# Patient Record
Sex: FEMALE | Race: BLACK OR AFRICAN AMERICAN | Employment: UNEMPLOYED | URBAN - NONMETROPOLITAN AREA
[De-identification: names, ages, dates, MRNs, and addresses within clinical notes are randomized per-mention and may not be internally consistent; named-entity substitution may affect disease eponyms.]

---

## 2022-11-06 ENCOUNTER — HOSPITAL ENCOUNTER (EMERGENCY)
Age: 22
Discharge: HOME | End: 2022-11-06
Attending: EMERGENCY MEDICINE

## 2022-11-06 VITALS
SYSTOLIC BLOOD PRESSURE: 121 MMHG | HEIGHT: 67 IN | WEIGHT: 293 LBS | BODY MASS INDEX: 45.99 KG/M2 | HEART RATE: 81 BPM | RESPIRATION RATE: 18 BRPM | TEMPERATURE: 98.9 F | DIASTOLIC BLOOD PRESSURE: 73 MMHG | OXYGEN SATURATION: 100 %

## 2022-11-06 DIAGNOSIS — G43.909 MIGRAINE WITHOUT STATUS MIGRAINOSUS, NOT INTRACTABLE, UNSPECIFIED MIGRAINE TYPE: Primary | ICD-10-CM

## 2022-11-06 PROCEDURE — 74011250636 HC RX REV CODE- 250/636: Performed by: EMERGENCY MEDICINE

## 2022-11-06 PROCEDURE — 74011250637 HC RX REV CODE- 250/637: Performed by: EMERGENCY MEDICINE

## 2022-11-06 PROCEDURE — 99283 EMERGENCY DEPT VISIT LOW MDM: CPT

## 2022-11-06 RX ORDER — ONDANSETRON 4 MG/1
8 TABLET, ORALLY DISINTEGRATING ORAL
Status: COMPLETED | OUTPATIENT
Start: 2022-11-06 | End: 2022-11-06

## 2022-11-06 RX ORDER — IBUPROFEN 800 MG/1
800 TABLET ORAL
Qty: 20 TABLET | Refills: 0 | Status: SHIPPED | OUTPATIENT
Start: 2022-11-06 | End: 2022-11-13

## 2022-11-06 RX ORDER — ONDANSETRON 8 MG/1
8 TABLET, ORALLY DISINTEGRATING ORAL
Qty: 12 TABLET | Refills: 0 | Status: SHIPPED | OUTPATIENT
Start: 2022-11-06

## 2022-11-06 RX ORDER — IBUPROFEN 800 MG/1
800 TABLET ORAL ONCE
Status: COMPLETED | OUTPATIENT
Start: 2022-11-06 | End: 2022-11-06

## 2022-11-06 RX ADMIN — IBUPROFEN 800 MG: 800 TABLET, FILM COATED ORAL at 11:18

## 2022-11-06 RX ADMIN — ONDANSETRON 8 MG: 4 TABLET, ORALLY DISINTEGRATING ORAL at 11:18

## 2022-11-06 NOTE — ED TRIAGE NOTES
Patient reports waking up this morning with migraine that has cause her nausea and has progressively gotten worse as the day has gone on. No history of same. 137.7 lbs (11/11)/dosing

## 2022-11-06 NOTE — ED PROVIDER NOTES
HPI 68-year-old female presents ED complaining of a left-sided temporal frontal headache with nausea no vomiting some photosensitivity. No fever or recent illness. Denies sinus symptoms. Patient has a history of unilateral migratory right and left headaches that are described as throbbing with nausea. No clear previous evaluation had been told at one point she had migraines. No focal neurologic symptoms. Transient right lateral visual blurring with onset of headache    History reviewed. No pertinent past medical history. History reviewed. No pertinent surgical history. History reviewed. No pertinent family history. Social History     Socioeconomic History    Marital status: UNKNOWN     Spouse name: Not on file    Number of children: Not on file    Years of education: Not on file    Highest education level: Not on file   Occupational History    Not on file   Tobacco Use    Smoking status: Never    Smokeless tobacco: Never   Substance and Sexual Activity    Alcohol use: Yes     Comment: occasional    Drug use: Never    Sexual activity: Yes     Birth control/protection: None   Other Topics Concern    Not on file   Social History Narrative    Not on file     Social Determinants of Health     Financial Resource Strain: Not on file   Food Insecurity: Not on file   Transportation Needs: Not on file   Physical Activity: Not on file   Stress: Not on file   Social Connections: Not on file   Intimate Partner Violence: Not on file   Housing Stability: Not on file         ALLERGIES: Latex and Pineapple    Review of Systems   Constitutional: Negative. HENT: Negative. Eyes: Negative. Respiratory:  Negative for cough, chest tightness, shortness of breath and wheezing. Cardiovascular:  Negative for chest pain, palpitations and leg swelling. Gastrointestinal:  Positive for nausea. Negative for abdominal distention, abdominal pain, blood in stool, constipation, diarrhea and vomiting.    Endocrine: Negative. Genitourinary:  Negative for difficulty urinating, dysuria, flank pain, frequency and hematuria. Musculoskeletal: Negative. Neurological:  Positive for headaches. Negative for dizziness, seizures, speech difficulty, weakness and numbness. Hematological: Negative. Psychiatric/Behavioral: Negative. Vitals:    11/06/22 1101   BP: 121/73   Pulse: 81   Resp: 18   Temp: 98.9 °F (37.2 °C)   SpO2: 100%   Weight: (!) 158.9 kg (350 lb 6.4 oz)   Height: 5' 7\" (1.702 m)          Pleasant young AA female no acute distress awake alert oriented x4 coherent  Physical Exam  Vitals and nursing note reviewed. Constitutional:       General: She is not in acute distress. Appearance: Normal appearance. She is obese. She is not ill-appearing, toxic-appearing or diaphoretic. HENT:      Head: Normocephalic and atraumatic. Right Ear: Tympanic membrane normal.      Left Ear: Tympanic membrane normal.      Nose: Nose normal.      Mouth/Throat:      Mouth: Mucous membranes are moist.   Eyes:      Extraocular Movements: Extraocular movements intact. Conjunctiva/sclera: Conjunctivae normal.      Pupils: Pupils are equal, round, and reactive to light. Cardiovascular:      Rate and Rhythm: Normal rate and regular rhythm. Pulses: Normal pulses. Heart sounds: Normal heart sounds. No murmur heard. Pulmonary:      Effort: Pulmonary effort is normal. No respiratory distress. Breath sounds: Normal breath sounds. No wheezing, rhonchi or rales. Abdominal:      General: Bowel sounds are normal. There is no distension. Palpations: Abdomen is soft. There is no mass. Tenderness: There is no abdominal tenderness. There is no right CVA tenderness, left CVA tenderness, guarding or rebound. Hernia: No hernia is present. Musculoskeletal:         General: No swelling, tenderness, deformity or signs of injury. Normal range of motion.       Cervical back: Normal range of motion and neck supple. No rigidity or tenderness. Right lower leg: No edema. Left lower leg: No edema. Lymphadenopathy:      Cervical: No cervical adenopathy. Skin:     General: Skin is warm and dry. Capillary Refill: Capillary refill takes less than 2 seconds. Findings: No erythema, lesion or rash. Neurological:      General: No focal deficit present. Mental Status: She is alert and oriented to person, place, and time. Mental status is at baseline. Cranial Nerves: No cranial nerve deficit. Sensory: No sensory deficit. Psychiatric:         Mood and Affect: Mood normal.         Behavior: Behavior normal.         Thought Content: Thought content normal.        MDM  58-year-old female with a multiyear history of unilateral migratory headaches that appear to be vascular or migraine in origin previously treated with over-the-counter meds.   Today with nausea primary associated symptoms options discussed regarding IV therapy versus oral versus IM patient prefers oral meds given Dr. Светлана Jay for follow-up instructed return to ED if symptoms worsen       Procedures

## 2023-05-04 ENCOUNTER — APPOINTMENT (OUTPATIENT)
Dept: GENERAL RADIOLOGY | Age: 23
End: 2023-05-04
Attending: EMERGENCY MEDICINE
Payer: COMMERCIAL

## 2023-05-04 ENCOUNTER — HOSPITAL ENCOUNTER (EMERGENCY)
Age: 23
Discharge: HOME | End: 2023-05-04
Attending: EMERGENCY MEDICINE
Payer: COMMERCIAL

## 2023-05-04 VITALS
RESPIRATION RATE: 17 BRPM | WEIGHT: 293 LBS | HEART RATE: 78 BPM | DIASTOLIC BLOOD PRESSURE: 78 MMHG | TEMPERATURE: 98.4 F | SYSTOLIC BLOOD PRESSURE: 119 MMHG | BODY MASS INDEX: 45.99 KG/M2 | HEIGHT: 67 IN | OXYGEN SATURATION: 99 %

## 2023-05-04 DIAGNOSIS — M94.0 COSTOCHONDRITIS: Primary | ICD-10-CM

## 2023-05-04 DIAGNOSIS — R07.89 CHEST WALL PAIN: ICD-10-CM

## 2023-05-04 LAB
ALBUMIN SERPL-MCNC: 3.6 G/DL (ref 3.5–5)
ALBUMIN/GLOB SERPL: 0.7 (ref 1.1–2.2)
ALP SERPL-CCNC: 94 U/L (ref 45–117)
ALT SERPL-CCNC: 28 U/L (ref 12–78)
ANION GAP SERPL CALC-SCNC: 9 MMOL/L (ref 5–15)
AST SERPL W P-5'-P-CCNC: 16 U/L (ref 15–37)
BASOPHILS # BLD: 0 K/UL (ref 0–0.1)
BASOPHILS NFR BLD: 0 % (ref 0–1)
BILIRUB SERPL-MCNC: 0.3 MG/DL (ref 0.2–1)
BUN SERPL-MCNC: 12 MG/DL (ref 6–20)
BUN/CREAT SERPL: 12 (ref 12–20)
CA-I BLD-MCNC: 9.4 MG/DL (ref 8.5–10.1)
CHLORIDE SERPL-SCNC: 105 MMOL/L (ref 97–108)
CO2 SERPL-SCNC: 27 MMOL/L (ref 21–32)
CREAT SERPL-MCNC: 0.98 MG/DL (ref 0.55–1.02)
DIFFERENTIAL METHOD BLD: ABNORMAL
EOSINOPHIL # BLD: 0.1 K/UL (ref 0–0.4)
EOSINOPHIL NFR BLD: 1 % (ref 0–7)
ERYTHROCYTE [DISTWIDTH] IN BLOOD BY AUTOMATED COUNT: 16.3 % (ref 11.5–14.5)
GLOBULIN SER CALC-MCNC: 4.9 G/DL (ref 2–4)
GLUCOSE SERPL-MCNC: 84 MG/DL (ref 65–100)
HCG UR QL: NEGATIVE
HCT VFR BLD AUTO: 39.7 % (ref 35–47)
HGB BLD-MCNC: 12.5 G/DL (ref 11.5–16)
IMM GRANULOCYTES # BLD AUTO: 0 K/UL (ref 0–0.04)
IMM GRANULOCYTES NFR BLD AUTO: 0 % (ref 0–0.5)
LYMPHOCYTES # BLD: 5 K/UL (ref 0.8–3.5)
LYMPHOCYTES NFR BLD: 46 % (ref 12–49)
MCH RBC QN AUTO: 24.8 PG (ref 26–34)
MCHC RBC AUTO-ENTMCNC: 31.5 G/DL (ref 30–36.5)
MCV RBC AUTO: 78.6 FL (ref 80–99)
MONOCYTES # BLD: 0.5 K/UL (ref 0–1)
MONOCYTES NFR BLD: 5 % (ref 5–13)
NEUTS SEG # BLD: 5.1 K/UL (ref 1.8–8)
NEUTS SEG NFR BLD: 48 % (ref 32–75)
NRBC # BLD: 0 K/UL (ref 0–0.01)
NRBC BLD-RTO: 0 PER 100 WBC
PLATELET # BLD AUTO: 414 K/UL (ref 150–400)
PMV BLD AUTO: 8.3 FL (ref 8.9–12.9)
POTASSIUM SERPL-SCNC: 3.4 MMOL/L (ref 3.5–5.1)
PROT SERPL-MCNC: 8.5 G/DL (ref 6.4–8.2)
RBC # BLD AUTO: 5.05 M/UL (ref 3.8–5.2)
SODIUM SERPL-SCNC: 141 MMOL/L (ref 136–145)
TROPONIN I SERPL HS-MCNC: 5 NG/L (ref 0–51)
WBC # BLD AUTO: 10.8 K/UL (ref 3.6–11)

## 2023-05-04 PROCEDURE — 84484 ASSAY OF TROPONIN QUANT: CPT

## 2023-05-04 PROCEDURE — 80053 COMPREHEN METABOLIC PANEL: CPT

## 2023-05-04 PROCEDURE — 71045 X-RAY EXAM CHEST 1 VIEW: CPT

## 2023-05-04 PROCEDURE — 81025 URINE PREGNANCY TEST: CPT

## 2023-05-04 PROCEDURE — 36415 COLL VENOUS BLD VENIPUNCTURE: CPT

## 2023-05-04 PROCEDURE — 85025 COMPLETE CBC W/AUTO DIFF WBC: CPT

## 2023-05-04 PROCEDURE — 93005 ELECTROCARDIOGRAM TRACING: CPT

## 2023-05-04 PROCEDURE — 99285 EMERGENCY DEPT VISIT HI MDM: CPT

## 2023-05-04 RX ORDER — METHYLPREDNISOLONE 4 MG/1
TABLET ORAL
Qty: 1 DOSE PACK | Refills: 0 | Status: SHIPPED | OUTPATIENT
Start: 2023-05-04

## 2023-05-05 LAB
ATRIAL RATE: 76 BPM
CALCULATED P AXIS, ECG09: 11 DEGREES
CALCULATED R AXIS, ECG10: 53 DEGREES
CALCULATED T AXIS, ECG11: -6 DEGREES
DIAGNOSIS, 93000: NORMAL
P-R INTERVAL, ECG05: 138 MS
Q-T INTERVAL, ECG07: 382 MS
QRS DURATION, ECG06: 71 MS
QTC CALCULATION (BEZET), ECG08: 430 MS
VENTRICULAR RATE, ECG03: 76 BPM

## 2023-09-12 ENCOUNTER — HOSPITAL ENCOUNTER (EMERGENCY)
Facility: HOSPITAL | Age: 23
Discharge: HOME | End: 2023-09-12
Attending: EMERGENCY MEDICINE

## 2023-09-12 VITALS
HEIGHT: 68 IN | DIASTOLIC BLOOD PRESSURE: 90 MMHG | WEIGHT: 293 LBS | HEART RATE: 80 BPM | SYSTOLIC BLOOD PRESSURE: 130 MMHG | OXYGEN SATURATION: 100 % | TEMPERATURE: 97.5 F | BODY MASS INDEX: 44.41 KG/M2 | RESPIRATION RATE: 18 BRPM

## 2023-09-12 DIAGNOSIS — B97.89 VIRAL RESPIRATORY ILLNESS: Primary | ICD-10-CM

## 2023-09-12 DIAGNOSIS — J98.8 VIRAL RESPIRATORY ILLNESS: Primary | ICD-10-CM

## 2023-09-12 LAB
FLUAV RNA SPEC QL NAA+PROBE: NOT DETECTED
FLUBV RNA SPEC QL NAA+PROBE: NOT DETECTED
SARS-COV-2 RNA RESP QL NAA+PROBE: NOT DETECTED

## 2023-09-12 PROCEDURE — 99283 EMERGENCY DEPT VISIT LOW MDM: CPT

## 2023-09-12 PROCEDURE — 87636 SARSCOV2 & INF A&B AMP PRB: CPT

## 2023-09-12 RX ORDER — GUAIFENESIN/DEXTROMETHORPHAN 100-10MG/5
5 SYRUP ORAL 4 TIMES DAILY PRN
Qty: 1 EACH | Refills: 0 | Status: SHIPPED | OUTPATIENT
Start: 2023-09-12 | End: 2023-09-22

## 2023-09-12 RX ORDER — ONDANSETRON 4 MG/1
4 TABLET, ORALLY DISINTEGRATING ORAL 3 TIMES DAILY PRN
Qty: 21 TABLET | Refills: 0 | Status: SHIPPED | OUTPATIENT
Start: 2023-09-12

## 2023-09-12 RX ORDER — IBUPROFEN 400 MG/1
400 TABLET ORAL EVERY 6 HOURS PRN
Qty: 120 TABLET | Refills: 0 | Status: SHIPPED | OUTPATIENT
Start: 2023-09-12

## 2023-09-12 RX ORDER — FLUTICASONE PROPIONATE 50 MCG
1 SPRAY, SUSPENSION (ML) NASAL DAILY
Qty: 1 EACH | Refills: 0 | Status: SHIPPED | OUTPATIENT
Start: 2023-09-12 | End: 2023-09-22

## 2023-09-12 RX ORDER — BENZONATATE 100 MG/1
100 CAPSULE ORAL 3 TIMES DAILY PRN
Qty: 20 CAPSULE | Refills: 0 | Status: SHIPPED | OUTPATIENT
Start: 2023-09-12 | End: 2023-09-19

## 2023-09-12 RX ORDER — ACETAMINOPHEN 500 MG
500 TABLET ORAL 4 TIMES DAILY PRN
Qty: 120 TABLET | Refills: 0 | Status: SHIPPED | OUTPATIENT
Start: 2023-09-12

## 2023-09-12 ASSESSMENT — PAIN - FUNCTIONAL ASSESSMENT: PAIN_FUNCTIONAL_ASSESSMENT: 0-10

## 2023-09-12 ASSESSMENT — LIFESTYLE VARIABLES
HOW OFTEN DO YOU HAVE A DRINK CONTAINING ALCOHOL: NEVER
HOW MANY STANDARD DRINKS CONTAINING ALCOHOL DO YOU HAVE ON A TYPICAL DAY: PATIENT DOES NOT DRINK

## 2023-09-12 ASSESSMENT — PAIN DESCRIPTION - LOCATION: LOCATION: LEG

## 2023-09-12 ASSESSMENT — PAIN DESCRIPTION - PAIN TYPE: TYPE: ACUTE PAIN

## 2023-09-12 ASSESSMENT — PAIN SCALES - GENERAL: PAINLEVEL_OUTOF10: 5

## 2023-09-12 NOTE — ED PROVIDER NOTES
ED COURSE       Disposition Considerations (Tests not done, Shared Decision Making, Pt Expectation of Test or Treatment.): Not Applicable    Patient was given the following medications:  Medications - No data to display    CONSULTS: (Who and What was discussed)  None     Social Determinants affecting Dx or Tx: None    Smoking Cessation: Not Applicable    PROCEDURES   Unless otherwise noted above, none  Procedures      CRITICAL CARE TIME   Patient does not meet Critical Care Time, 0 minutes    ED FINAL IMPRESSION     1. Viral respiratory illness          DISPOSITION/PLAN   DISPOSITION      Discharge Note: The patient is stable for discharge home. The signs, symptoms, diagnosis, and discharge instructions have been discussed, understanding conveyed, and agreed upon. The patient is to follow up as recommended or return to ER should their symptoms worsen.       PATIENT REFERRED TO:  your primary care provider    Call in 3 days  for followup        DISCHARGE MEDICATIONS:     Medication List        START taking these medications      acetaminophen 500 MG tablet  Commonly known as: TYLENOL  Take 1 tablet by mouth 4 times daily as needed for Pain     benzonatate 100 MG capsule  Commonly known as: TESSALON  Take 1 capsule by mouth 3 times daily as needed for Cough     fluticasone 50 MCG/ACT nasal spray  Commonly known as: FLONASE  1 spray by Nasal route daily for 10 days     guaiFENesin-dextromethorphan 100-10 MG/5ML syrup  Commonly known as: ROBITUSSIN DM  Take 5 mLs by mouth 4 times daily as needed for Cough     ibuprofen 400 MG tablet  Commonly known as: IBU  Take 1 tablet by mouth every 6 hours as needed for Pain            CHANGE how you take these medications      ondansetron 4 MG disintegrating tablet  Commonly known as: ZOFRAN-ODT  Take 1 tablet by mouth 3 times daily as needed for Nausea or Vomiting  What changed:   medication strength  how much to take  when to take this  reasons to take this

## 2024-03-30 ENCOUNTER — HOSPITAL ENCOUNTER (INPATIENT)
Facility: HOSPITAL | Age: 24
LOS: 4 days | Discharge: HOME | DRG: 881 | End: 2024-04-03
Attending: EMERGENCY MEDICINE | Admitting: PSYCHIATRY & NEUROLOGY

## 2024-03-30 DIAGNOSIS — R45.851 SUICIDAL IDEATION: Primary | ICD-10-CM

## 2024-03-30 PROBLEM — F12.10 CANNABIS ABUSE: Status: ACTIVE | Noted: 2024-03-30

## 2024-03-30 PROBLEM — F41.1 GENERALIZED ANXIETY DISORDER: Status: ACTIVE | Noted: 2024-03-30

## 2024-03-30 PROBLEM — F32.2 MAJOR DEPRESSIVE DISORDER, SEVERE (HCC): Status: ACTIVE | Noted: 2024-03-30

## 2024-03-30 PROBLEM — F32.9 MAJOR DEPRESSIVE DISORDER WITH SINGLE EPISODE: Status: ACTIVE | Noted: 2024-03-30

## 2024-03-30 PROBLEM — F32.A DEPRESSION WITH SUICIDAL IDEATION: Status: ACTIVE | Noted: 2024-03-30

## 2024-03-30 PROBLEM — F43.10 POST TRAUMATIC STRESS DISORDER (PTSD): Status: ACTIVE | Noted: 2024-03-30

## 2024-03-30 PROBLEM — F32.A DEPRESSION WITH SUICIDAL IDEATION: Status: RESOLVED | Noted: 2024-03-30 | Resolved: 2024-03-30

## 2024-03-30 LAB
ALBUMIN SERPL-MCNC: 3.4 G/DL (ref 3.5–5)
ALBUMIN/GLOB SERPL: 0.6 (ref 1.1–2.2)
ALP SERPL-CCNC: 109 U/L (ref 45–117)
ALT SERPL-CCNC: 28 U/L (ref 12–78)
AMPHET UR QL SCN: NEGATIVE
ANION GAP SERPL CALC-SCNC: 12 MMOL/L (ref 5–15)
APAP SERPL-MCNC: <2 UG/ML (ref 10–30)
APPEARANCE UR: CLEAR
AST SERPL W P-5'-P-CCNC: 15 U/L (ref 15–37)
BACTERIA URNS QL MICRO: ABNORMAL /HPF
BARBITURATES UR QL SCN: NEGATIVE
BASOPHILS # BLD: 0 K/UL (ref 0–0.1)
BASOPHILS NFR BLD: 0 % (ref 0–1)
BENZODIAZ UR QL: NEGATIVE
BILIRUB SERPL-MCNC: 0.3 MG/DL (ref 0.2–1)
BILIRUB UR QL: NEGATIVE
BUN SERPL-MCNC: 10 MG/DL (ref 6–20)
BUN/CREAT SERPL: 11 (ref 12–20)
CA-I BLD-MCNC: 9.2 MG/DL (ref 8.5–10.1)
CANNABINOIDS UR QL SCN: POSITIVE
CHLORIDE SERPL-SCNC: 101 MMOL/L (ref 97–108)
CO2 SERPL-SCNC: 25 MMOL/L (ref 21–32)
COCAINE UR QL SCN: NEGATIVE
COLOR UR: ABNORMAL
CREAT SERPL-MCNC: 0.93 MG/DL (ref 0.55–1.02)
DATE LAST DOSE: ABNORMAL
DATE LAST DOSE: ABNORMAL
DIFFERENTIAL METHOD BLD: ABNORMAL
DOSE AMOUNT: ABNORMAL UNITS
DOSE AMOUNT: ABNORMAL UNITS
EOSINOPHIL # BLD: 0.1 K/UL (ref 0–0.4)
EOSINOPHIL NFR BLD: 1 % (ref 0–7)
ERYTHROCYTE [DISTWIDTH] IN BLOOD BY AUTOMATED COUNT: 16.2 % (ref 11.5–14.5)
ETHANOL SERPL-MCNC: <10 MG/DL (ref 0–0.08)
FLUAV RNA SPEC QL NAA+PROBE: NOT DETECTED
FLUBV RNA SPEC QL NAA+PROBE: NOT DETECTED
GLOBULIN SER CALC-MCNC: 5.8 G/DL (ref 2–4)
GLUCOSE SERPL-MCNC: 105 MG/DL (ref 65–100)
GLUCOSE UR STRIP.AUTO-MCNC: NEGATIVE MG/DL
HCG UR QL: NEGATIVE
HCT VFR BLD AUTO: 39.8 % (ref 35–47)
HGB BLD-MCNC: 12.9 G/DL (ref 11.5–16)
HGB UR QL STRIP: ABNORMAL
IMM GRANULOCYTES # BLD AUTO: 0.1 K/UL (ref 0–0.04)
IMM GRANULOCYTES NFR BLD AUTO: 1 % (ref 0–0.5)
KETONES UR QL STRIP.AUTO: NEGATIVE MG/DL
LEUKOCYTE ESTERASE UR QL STRIP.AUTO: ABNORMAL
LYMPHOCYTES # BLD: 4 K/UL (ref 0.8–3.5)
LYMPHOCYTES NFR BLD: 36 % (ref 12–49)
Lab: ABNORMAL
MAGNESIUM SERPL-MCNC: 1.8 MG/DL (ref 1.6–2.4)
MCH RBC QN AUTO: 24.8 PG (ref 26–34)
MCHC RBC AUTO-ENTMCNC: 32.4 G/DL (ref 30–36.5)
MCV RBC AUTO: 76.4 FL (ref 80–99)
MDMA URINE: NEGATIVE
METHADONE UR QL: NEGATIVE
MONOCYTES # BLD: 0.5 K/UL (ref 0–1)
MONOCYTES NFR BLD: 5 % (ref 5–13)
NEUTS SEG # BLD: 6.2 K/UL (ref 1.8–8)
NEUTS SEG NFR BLD: 57 % (ref 32–75)
NITRITE UR QL STRIP.AUTO: NEGATIVE
NRBC # BLD: 0 K/UL (ref 0–0.01)
NRBC BLD-RTO: 0 PER 100 WBC
OPIATES UR QL: NEGATIVE
PCP UR QL: NEGATIVE
PH UR STRIP: 6 (ref 5–8)
PLATELET # BLD AUTO: 394 K/UL (ref 150–400)
PMV BLD AUTO: 8.2 FL (ref 8.9–12.9)
POTASSIUM SERPL-SCNC: 3.8 MMOL/L (ref 3.5–5.1)
PROT SERPL-MCNC: 9.2 G/DL (ref 6.4–8.2)
PROT UR STRIP-MCNC: NEGATIVE MG/DL
RBC # BLD AUTO: 5.21 M/UL (ref 3.8–5.2)
RBC #/AREA URNS HPF: ABNORMAL /HPF (ref 0–3)
SALICYLATES SERPL-MCNC: 2.5 MG/DL (ref 2.8–20)
SARS-COV-2 RNA RESP QL NAA+PROBE: NOT DETECTED
SODIUM SERPL-SCNC: 138 MMOL/L (ref 136–145)
SP GR UR REFRACTOMETRY: 1.02 (ref 1–1.03)
UROBILINOGEN UR QL STRIP.AUTO: 0.2 EU/DL (ref 0.2–1)
WBC # BLD AUTO: 10.9 K/UL (ref 3.6–11)
WBC URNS QL MICRO: ABNORMAL /HPF (ref 0–5)

## 2024-03-30 PROCEDURE — 36415 COLL VENOUS BLD VENIPUNCTURE: CPT

## 2024-03-30 PROCEDURE — 80179 DRUG ASSAY SALICYLATE: CPT

## 2024-03-30 PROCEDURE — 83036 HEMOGLOBIN GLYCOSYLATED A1C: CPT

## 2024-03-30 PROCEDURE — 82077 ASSAY SPEC XCP UR&BREATH IA: CPT

## 2024-03-30 PROCEDURE — 81001 URINALYSIS AUTO W/SCOPE: CPT

## 2024-03-30 PROCEDURE — 6370000000 HC RX 637 (ALT 250 FOR IP): Performed by: PSYCHIATRY & NEUROLOGY

## 2024-03-30 PROCEDURE — 80143 DRUG ASSAY ACETAMINOPHEN: CPT

## 2024-03-30 PROCEDURE — 80307 DRUG TEST PRSMV CHEM ANLYZR: CPT

## 2024-03-30 PROCEDURE — 81025 URINE PREGNANCY TEST: CPT

## 2024-03-30 PROCEDURE — 80053 COMPREHEN METABOLIC PANEL: CPT

## 2024-03-30 PROCEDURE — 87636 SARSCOV2 & INF A&B AMP PRB: CPT

## 2024-03-30 PROCEDURE — 1240000000 HC EMOTIONAL WELLNESS R&B

## 2024-03-30 PROCEDURE — 85025 COMPLETE CBC W/AUTO DIFF WBC: CPT

## 2024-03-30 PROCEDURE — 80061 LIPID PANEL: CPT

## 2024-03-30 PROCEDURE — 6370000000 HC RX 637 (ALT 250 FOR IP): Performed by: PHYSICIAN ASSISTANT

## 2024-03-30 PROCEDURE — 83735 ASSAY OF MAGNESIUM: CPT

## 2024-03-30 PROCEDURE — 99285 EMERGENCY DEPT VISIT HI MDM: CPT

## 2024-03-30 RX ORDER — HALOPERIDOL 5 MG/1
5 TABLET ORAL EVERY 4 HOURS PRN
Status: DISCONTINUED | OUTPATIENT
Start: 2024-03-30 | End: 2024-04-03 | Stop reason: HOSPADM

## 2024-03-30 RX ORDER — HALOPERIDOL 5 MG/ML
5 INJECTION INTRAMUSCULAR EVERY 4 HOURS PRN
Status: DISCONTINUED | OUTPATIENT
Start: 2024-03-30 | End: 2024-04-03 | Stop reason: HOSPADM

## 2024-03-30 RX ORDER — HYDROXYZINE 50 MG/1
50 TABLET, FILM COATED ORAL 3 TIMES DAILY PRN
Status: DISCONTINUED | OUTPATIENT
Start: 2024-03-30 | End: 2024-04-03 | Stop reason: HOSPADM

## 2024-03-30 RX ORDER — ACETAMINOPHEN 325 MG/1
650 TABLET ORAL EVERY 4 HOURS PRN
Status: DISCONTINUED | OUTPATIENT
Start: 2024-03-30 | End: 2024-04-03 | Stop reason: HOSPADM

## 2024-03-30 RX ORDER — MAGNESIUM HYDROXIDE/ALUMINUM HYDROXICE/SIMETHICONE 120; 1200; 1200 MG/30ML; MG/30ML; MG/30ML
30 SUSPENSION ORAL EVERY 6 HOURS PRN
Status: DISCONTINUED | OUTPATIENT
Start: 2024-03-30 | End: 2024-04-03 | Stop reason: HOSPADM

## 2024-03-30 RX ORDER — DIPHENHYDRAMINE HYDROCHLORIDE 50 MG/ML
50 INJECTION INTRAMUSCULAR; INTRAVENOUS EVERY 4 HOURS PRN
Status: DISCONTINUED | OUTPATIENT
Start: 2024-03-30 | End: 2024-04-03 | Stop reason: HOSPADM

## 2024-03-30 RX ORDER — ESCITALOPRAM OXALATE 10 MG/1
10 TABLET ORAL DAILY
Status: DISCONTINUED | OUTPATIENT
Start: 2024-03-30 | End: 2024-04-03 | Stop reason: HOSPADM

## 2024-03-30 RX ORDER — TRAZODONE HYDROCHLORIDE 50 MG/1
50 TABLET ORAL NIGHTLY PRN
Status: DISCONTINUED | OUTPATIENT
Start: 2024-03-30 | End: 2024-04-03 | Stop reason: HOSPADM

## 2024-03-30 RX ORDER — POLYETHYLENE GLYCOL 3350 17 G/17G
17 POWDER, FOR SOLUTION ORAL DAILY PRN
Status: DISCONTINUED | OUTPATIENT
Start: 2024-03-30 | End: 2024-04-03 | Stop reason: HOSPADM

## 2024-03-30 RX ADMIN — ESCITALOPRAM OXALATE 10 MG: 10 TABLET ORAL at 20:51

## 2024-03-30 RX ADMIN — HYDROXYZINE HYDROCHLORIDE 50 MG: 50 TABLET, FILM COATED ORAL at 21:10

## 2024-03-30 ASSESSMENT — LIFESTYLE VARIABLES
HOW MANY STANDARD DRINKS CONTAINING ALCOHOL DO YOU HAVE ON A TYPICAL DAY: PATIENT DOES NOT DRINK
HOW OFTEN DO YOU HAVE A DRINK CONTAINING ALCOHOL: NEVER

## 2024-03-30 ASSESSMENT — SLEEP AND FATIGUE QUESTIONNAIRES
SLEEP PATTERN: DIFFICULTY FALLING ASLEEP
DO YOU USE A SLEEP AID: NO
DO YOU HAVE DIFFICULTY SLEEPING: YES
AVERAGE NUMBER OF SLEEP HOURS: 5

## 2024-03-30 NOTE — ED PROVIDER NOTES
[facility] EMERGENCY DEPT  EMERGENCY DEPARTMENT HISTORY AND PHYSICAL EXAM      Date: 3/30/2024  Patient Name: Princess Tracey  MRN: [id number]  YOB: 2000  Date of evaluation: 3/30/2024  Provider: Osvaldo Hernandez MD   Note Started: 12:41 PM EDT 3/30/24    HISTORY OF PRESENT ILLNESS     Chief Complaint   Patient presents with    Mental Health Problem       History Provided By: Patient    HPI: Princess Tracey is a 24 y.o. female here with suicidal ideation.  Her  found her in the bathroom crying with a gun.  Patient states she is been depressed for several years on and off but has never seen a therapist.  She states when she was on some medication for anxiety while in college but does not recall the name of the medicine.  Things have been worse recently with a lot of stress and death in the family.  No hallucinations.  Denies any substance use.  States she is a social drinker.  No alcohol today.    PAST MEDICAL HISTORY   Past Medical History:  History reviewed. No pertinent past medical history.    Past Surgical History:  History reviewed. No pertinent surgical history.    Family History:  History reviewed. No pertinent family history.    Social History:  Social History     Tobacco Use    Smoking status: Never    Smokeless tobacco: Never   Substance Use Topics    Alcohol use: Yes    Drug use: Never       Allergies:  Allergies   Allergen Reactions    Latex Hives    Pineapple Anaphylaxis       PCP: No primary care provider on file.    Current Meds:   No current facility-administered medications for this encounter.       Social Determinants of Health:   Social Determinants of Health     Tobacco Use: Low Risk  (3/30/2024)    Patient History     Smoking Tobacco Use: Never     Smokeless Tobacco Use: Never     Passive Exposure: Not on file   Alcohol Use: Not At Risk (3/30/2024)    AUDIT-C     Frequency of Alcohol Consumption: Never     Average Number of Drinks: Patient does not drink     Frequency of Binge

## 2024-03-30 NOTE — ED TRIAGE NOTES
Pt reports she has been under a lot of stress lately with school and having multiple deaths in the family. Family member at bedside reports around 1am he found the pt in the restroom with a gun , pt reports she needs help before she does something bad. PT is calm and tearful. Pt reports she does not plan to harm herself at this. Pt endorses anxiety and depression denies HI, A/V hallucinations. Denies past psychiatric hospitalizations

## 2024-03-30 NOTE — BSMART NOTE
Comprehensive Assessment Form Part 1      Section I - Disposition    Primary Diagnosis: SI with plan, depression, anxiety      The Medical Doctor to Psychiatrist conference was notcompleted.  The Medical Doctor is in agreement with intake assessment.  The plan is medical clearance and voluntary admission.  The on-call Psychiatrist consulted was Dr. SAPP.  The admitting Psychiatrist will be Dr. garcia.  The admitting Diagnosis is SI with plan.      This writer reviewed the [place] Suicide Severity Rating Scale in nursing flowsheet and the risk level assigned is no risk.  Based on this assessment, the risk of suicide is high risk and the plan is see above.    BSMART assessment completed, and suicide risk level noted to be high. Primary Nurse Viktoria and Charge Nurse KVNG/SERJIO and Physician David notified. Concerns observed by this writer.           Section II - Integrated Summary  Summary:  Pt seen and assessed via [facility] at [facility].  Pt dressed in own clothing and appears stated age. Pt resting on ER stretcher in no apparent physical distress.  Pt presents to the ER with complaint of SI with plan.  Pt reports historical diagnosis of anxiety and is not taking prescription medications.  Pt has no provider and has no past inpt admissions.  Pt presents with flat affect and tearful throughout assessment.  Pt alert and oriented to all spheres.  Pt denies HI/AVH.  Pt currently attends [employer] in [facility] and is projected to graduate in May 2024.  Pt states she has been 'overwhelmed with life' and 'overthinking thing' recently.  Pt reports feeling depressed 'for a while' and this morning she retrieved her loaded Glock 44 gun and went into the bathroom with the intention of ending her life.  She states her  happened to walk into the bathroom while she was picking up the gun from the counter.  He was able to stop her without incident and bring her to the hospital.  Pt states she has no past history of SI/NSSI,

## 2024-03-30 NOTE — H&P
INITIAL PSYCHIATRIC EVALUATION            IDENTIFICATION:    Patient Name  Princess Tracey   Date of Birth 2000   [facility] [id number]   Medical Record Number  [id number]      Age  24 y.o.   PCP No primary care provider on file.   Admit date:  3/30/2024    Room Number  105/01  @ [facility]   Date of Service   Last Encounter w/Dept 3/30/2024  Visit date not found             HISTORY         REASON FOR HOSPITALIZATION:  CC: \"I am not sure what I was thinking\". Pt admitted under a voluntary basis for anxiety with suicidal ideations proving to be an imminent danger to self.   HISTORY OF PRESENT ILLNESS:    The patient, Princess Tracey, is a 24 y.o.  Black /  female no past psychiatric history  came to the emergency department after concern of her own and  related to bringing a gun into the bathroom. She states that she normally takes a gun with to take a shower but her  felt she was \"looking or peering\" at the gun differently this time.  When she was asked how she was feeling she responded vaguely and uncertainly.  This response and in context of a prior disagreement opted to come in for help.  Although she describes few to no depressive symptoms but feeling more anxious and overwhelmed. Normally she describes herself as the one everyone leans on for support just that things were building up inside.    She does use marijuana to relax most days with a positive UDS for the same.    Endorses intermittent depressed mood, episodic tearfulness, anhedonia otherwise denies  feelings of guilt, hopelessness, helplessness and worthlessness.  Energy is adequate.  Denied any thoughts of self-harm or suicidal ideation during evaluation.    Endorses excessive worrying, denies social anxiety, panic attacks and OCD.     Denies low threshold of frustration or anger.    Denies history of decreased need for sleep associated with increased energy, racing thoughts, rapid speech and

## 2024-03-31 PROCEDURE — 1240000000 HC EMOTIONAL WELLNESS R&B

## 2024-03-31 PROCEDURE — 6370000000 HC RX 637 (ALT 250 FOR IP): Performed by: PHYSICIAN ASSISTANT

## 2024-03-31 PROCEDURE — 6370000000 HC RX 637 (ALT 250 FOR IP): Performed by: PSYCHIATRY & NEUROLOGY

## 2024-03-31 RX ADMIN — ESCITALOPRAM OXALATE 10 MG: 10 TABLET ORAL at 09:16

## 2024-03-31 RX ADMIN — TRAZODONE HYDROCHLORIDE 50 MG: 50 TABLET ORAL at 20:56

## 2024-03-31 RX ADMIN — HYDROXYZINE HYDROCHLORIDE 50 MG: 50 TABLET, FILM COATED ORAL at 20:56

## 2024-03-31 NOTE — GROUP NOTE
Group Therapy Note    Date: 3/31/2024    Group Start Time: 1445  Group End Time: 1530  Group Topic: Activity    [facility]    Britney Uribe LPN        Group Therapy Note    Attendees: 4       Patient's Goal:  TO MAKE IT THROUGH TODAY AND START TO MANAGE AND TALK ABOUT WHAT MAKES ME ANXIOUS.    Notes:  TV/MOVIE    Status After Intervention:  Improved    Participation Level: Active Listener    Participation Quality: Appropriate and Attentive      Speech:  normal      Thought Process/Content: Logical      Affective Functioning: Congruent      Mood:  CALM      Level of consciousness:  Alert      Response to Learning: Able to verbalize current knowledge/experience      Endings: None Reported    Modes of Intervention: Socialization and Activity      Discipline Responsible: Licensed Practical Nurse and Behavorial Health Tech      Signature:  Britney rUibe LPN     5

## 2024-03-31 NOTE — CONSULTS
Cannabis abuse 3/30/2024 Yes      Patient Active Problem List    Diagnosis Date Noted    Post traumatic stress disorder (PTSD) 03/30/2024    Generalized anxiety disorder 03/30/2024    Major depressive disorder with single episode 03/30/2024    Cannabis abuse 03/30/2024         Obesity  Patient reports a prior diagnosis of prediabetes, her A1c here is within normal range  Spoke with patient about healthy diet, physical activity, weight loss for her future health    2.  Sinus tachycardia  Likely related to anxiety and obesity, however will check TSH and consider EKG if tachycardia persists  Reviewed prior EKG from 2023 showing normal sinus rhythm  Other vital signs normal, no palpitations no chest pain no shortness of breath    3.  Asymptomatic bacteriuria  No intervention indicated, patient denies any symptoms, denies any STD symptoms as well    4.  Cannabis and nicotine use  Encourage cessation of both    5.  Major depressive disorder, RHIANNA, PTSD; suicidal ideation   Management as per primary psychiatry team        Thank you for allowing us to help care for your patient please call with any questions or concerns    55 minutes evaluating and cordinating patient's consult to behavioral health unit for medical and neurological evaluation requested by Dr. Mckay RIVERA        Electronically signed by Vangie Zamora MD on 3/31/2024 at 3:08 PM

## 2024-03-31 NOTE — GROUP NOTE
Group Therapy Note    Date: 3/30/2024    Group Start Time: 2000  Group End Time: 2045  Group Topic: Wrap-Up    [facility]    Elmira Cueva CNA        Group Therapy Note    Attendees: 4       Patient's Goal:  Tomake it through today and she did and know she is feeling better and want to continue to feel better    Notes:  participated in group    Status After Intervention:  Improved    Participation Level: Minimal    Participation Quality: Appropriate      Speech:  normal      Thought Process/Content: Logical      Affective Functioning: Congruent      Mood: anxious and depressed      Level of consciousness:  Alert and Oriented x4      Response to Learning: Able to verbalize current knowledge/experience, Able to verbalize/acknowledge new learning, Able to retain information, Capable of insight, Able to change behavior, and Progressing to goal      Endings: None Reported    Modes of Intervention: Activity      Discipline Responsible: Behavorial Health Tech      Signature:  Elmira Cueva CNA

## 2024-03-31 NOTE — GROUP NOTE
Group Therapy Note    Date: 3/31/2024    Group Start Time: 1000  Group End Time: 1045  Group Topic: Community Meeting    [facility]    Britney Uribe LPN        Group Therapy Note    Attendees: 3       Patient's Goal:  TO MAKE IT THROUGH TODAY AND START TO MANAGE AND TALK  ABOUT WHAT MAKES ME ANXIOUS.    Notes:  COMMUNITY    Status After Intervention:  Improved    Participation Level: Active Listener    Participation Quality: Appropriate and Attentive      Speech:  normal      Thought Process/Content: Logical      Affective Functioning: Congruent      Mood:  CALM      Level of consciousness:  Alert      Response to Learning: Able to verbalize current knowledge/experience      Endings: None Reported    Modes of Intervention: Support      Discipline Responsible: Licensed Practical Nurse and Behavorial Health Tech      Signature:  Britney Uribe LPN

## 2024-03-31 NOTE — PLAN OF CARE
Problem: Discharge Planning  Goal: Discharge to home or other facility with appropriate resources  Outcome: Progressing     Problem: Self Harm/Suicidality  Goal: Will have no self-injury during hospital stay  Description: INTERVENTIONS:  1.  Ensure constant observer at bedside with Q15M safety checks  2.  Maintain a safe environment  3.  Secure patient belongings  4.  Ensure family/visitors adhere to safety recommendations  5.  Ensure safety tray has been added to patient's diet order  6.  Every shift and PRN: Re-assess suicidal risk via Frequent Screener    3/30/2024 2357 by Clint Pickens, RN  Outcome: Progressing  Flowsheets (Taken 3/30/2024 2332)  Will have no self-injury during hospital stay: Maintain a safe environment  3/30/2024 1837 by Ananda Lambert, RN  Outcome: Progressing     Problem: Depression  Goal: Will be euthymic at discharge  Description: INTERVENTIONS:  1. Administer medication as ordered  2. Provide emotional support via 1:1 interaction with staff  3. Encourage involvement in milieu/groups/activities  4. Monitor for social isolation  Outcome: Progressing     Problem: Anxiety  Goal: Will report anxiety at manageable levels  Description: INTERVENTIONS:  1. Administer medication as ordered  2. Teach and rehearse alternative coping skills  3. Provide emotional support with 1:1 interaction with staff  Outcome: Progressing  Flowsheets (Taken 3/30/2024 2332)  Will report anxiety at manageable levels: Administer medication as ordered

## 2024-03-31 NOTE — GROUP NOTE
Group Therapy Note    Date: 3/31/2024    Group Start Time: 1045  Group End Time: 1130  Group Topic: Education Group - Inpatient    [facility]    Britney Uribe LPN        Group Therapy Note    Attendees: 3       Patient's Goal:  TO MAKE IT THROUGH TODAY AND START TO MANAGE AND TALK ABOUT WHAT MAKES ME ANXIOUS.    Notes:  COPING SKILLS    Status After Intervention:  Improved    Participation Level: Active Listener    Participation Quality: Appropriate and Attentive      Speech:  normal      Thought Process/Content: Logical      Affective Functioning: Congruent      Mood:  CALM      Level of consciousness:  Alert      Response to Learning: Able to verbalize current knowledge/experience      Endings: None Reported    Modes of Intervention: Education      Discipline Responsible: Licensed Practical Nurse and Behavorial Health Tech      Signature:  Britney Uribe LPN     Other (Free Text): Advised patient to wear compress socks for swelling. Note Text (......Xxx Chief Complaint.): This diagnosis correlates with the Detail Level: Simple

## 2024-04-01 LAB
CHOLEST SERPL-MCNC: 181 MG/DL
EST. AVERAGE GLUCOSE BLD GHB EST-MCNC: 126 MG/DL
HBA1C MFR BLD: 6 % (ref 4–5.6)
HDLC SERPL-MCNC: 36 MG/DL
HDLC SERPL: 5 (ref 0–5)
LDLC SERPL CALC-MCNC: 127.2 MG/DL (ref 0–100)
LIPID PANEL: ABNORMAL
TRIGL SERPL-MCNC: 89 MG/DL
VLDLC SERPL CALC-MCNC: 17.8 MG/DL

## 2024-04-01 PROCEDURE — 1240000000 HC EMOTIONAL WELLNESS R&B

## 2024-04-01 PROCEDURE — 6370000000 HC RX 637 (ALT 250 FOR IP): Performed by: PHYSICIAN ASSISTANT

## 2024-04-01 PROCEDURE — 6370000000 HC RX 637 (ALT 250 FOR IP): Performed by: PSYCHIATRY & NEUROLOGY

## 2024-04-01 PROCEDURE — 36415 COLL VENOUS BLD VENIPUNCTURE: CPT

## 2024-04-01 PROCEDURE — 84443 ASSAY THYROID STIM HORMONE: CPT

## 2024-04-01 RX ADMIN — TRAZODONE HYDROCHLORIDE 50 MG: 50 TABLET ORAL at 21:04

## 2024-04-01 RX ADMIN — ESCITALOPRAM OXALATE 10 MG: 10 TABLET ORAL at 09:15

## 2024-04-01 RX ADMIN — HYDROXYZINE HYDROCHLORIDE 50 MG: 50 TABLET, FILM COATED ORAL at 21:04

## 2024-04-01 NOTE — PROGRESS NOTES
Psychiatric Progress Note      Patient: Princess Tracey MRN: [id number]  SSN: [id number]    YOB: 2000  Age: 24 y.o.  Sex: female      Admit Date: 3/30/2024       Subjective:     Princess Tracey patient admitted having suicidal behaviors prior to coming to the hospital.  Reported feeling overwhelmed and stressed.  Reported feeling better after she came to the hospital.  Denied any suicidal or homicidal ideation this morning.  Stated that she needs to attend a  on Wednesday.  Stated that she was also planning to follow up with outpatient mental health services.  Attending groups and working on coping skills.  Reported good sleep and appetite.  Denies side effects of medications.    Objective:     Vitals:    24 0615 24 1600 24 0615 24 1554   BP: 119/72 120/75 (!) 135/90 109/68   Pulse: (!) 107 95 (!) 110 91   Resp: 18 20 18 18   Temp: 97.5 °F (36.4 °C) 97.7 °F (36.5 °C) 97.7 °F (36.5 °C) 97.7 °F (36.5 °C)   TempSrc: Temporal Temporal Temporal Temporal   SpO2: 98% 98% 97% 98%   Weight:       Height:            Mental Status Exam:     Appearance-fairly groomed  Alert, oriented x4  Speech -normal rate, volume and rhythm  Mood-depressed/anxious-improving  Affect-congruent  Thought process-linear and goal directed  Thought content-no delusions   Thought perception-no auditory or visual hallucinations   No suicidal or homicidal ideations   Insight fair  Judgement fair     MEDICATIONS:  Current Facility-Administered Medications   Medication Dose Route Frequency    acetaminophen (TYLENOL) tablet 650 mg  650 mg Oral Q4H PRN    polyethylene glycol (GLYCOLAX) packet 17 g  17 g Oral Daily PRN    haloperidol (HALDOL) tablet 5 mg  5 mg Oral Q4H PRN    Or    haloperidol lactate (HALDOL) injection 5 mg  5 mg IntraMUSCular Q4H PRN    diphenhydrAMINE (BENADRYL) injection 50 mg  50 mg IntraMUSCular Q4H PRN    traZODone (DESYREL) tablet 50 mg  50 mg Oral Nightly PRN    aluminum & magnesium

## 2024-04-01 NOTE — PLAN OF CARE
Problem: Discharge Planning  Goal: Discharge to home or other facility with appropriate resources  Outcome: Progressing     Problem: Self Harm/Suicidality  Goal: Will have no self-injury during hospital stay  Description: INTERVENTIONS:  1.  Ensure constant observer at bedside with Q15M safety checks  2.  Maintain a safe environment  3.  Secure patient belongings  4.  Ensure family/visitors adhere to safety recommendations  5.  Ensure safety tray has been added to patient's diet order  6.  Every shift and PRN: Re-assess suicidal risk via Frequent Screener    4/1/2024 0147 by Clint Pickens RN  Outcome: Progressing  3/31/2024 1311 by Ananda Lambert RN  Outcome: Progressing     Problem: Depression  Goal: Will be euthymic at discharge  Description: INTERVENTIONS:  1. Administer medication as ordered  2. Provide emotional support via 1:1 interaction with staff  3. Encourage involvement in milieu/groups/activities  4. Monitor for social isolation  4/1/2024 0147 by Clint Pickens RN  Outcome: Progressing  3/31/2024 1311 by Ananda Lambert RN  Outcome: Progressing     Problem: Anxiety  Goal: Will report anxiety at manageable levels  Description: INTERVENTIONS:  1. Administer medication as ordered  2. Teach and rehearse alternative coping skills  3. Provide emotional support with 1:1 interaction with staff  4/1/2024 0147 by Clint Pickens RN  Outcome: Progressing  3/31/2024 1311 by Ananda Lambert RN  Outcome: Progressing

## 2024-04-01 NOTE — GROUP NOTE
Group Therapy Note    Date: 4/1/2024    Group Start Time: 0900  Group End Time: 1000  Group Topic: Community Meeting    [facility]    Cindy Merrill         Patient's Goal:  To make it through without crying  Slept 8 hrs   Ate 1/2 of meals    Notes:   Pt describes  Depression 0   Anxiety 22   pain 0    Progress 6   Situation handled well  : Had bad dream , did not panic  Thought about family and calmed down   Had problem with:  Missing  family    Would like tospaek to treatment team about:  how yesterday's news made you feel  No thoughts of self harm   Is  taking medications/ no side effects    Status After Intervention:  Improved    Participation Level: Active Listener and Interactive    Participation Quality: Appropriate and Attentive      Speech:  normal      Thought Process/Content: Logical      Affective Functioning: Congruent      Mood:  Calm      Level of consciousness:  Alert and Attentive      Response to Learning: Able to verbalize current knowledge/experience and Able to verbalize/acknowledge new learning      Endings: None Reported    Modes of Intervention: Support      Discipline Responsible: Behavorial Health Tech      Signature:  Cindy Early

## 2024-04-01 NOTE — GROUP NOTE
Group Therapy Note    Date: 3/31/2024    Group Start Time: 2000  Group End Time: 2045  Group Topic: Wrap-Up    [facility]    Elmira Cueva CNA        Group Therapy Note    Attendees: 4       Patient's Goal:  To  Make It Through the day    Notes:  participated in group    Status After Intervention:  Improved    Participation Level: Active Listener    Participation Quality: Appropriate      Speech:  normal      Thought Process/Content: Logical      Affective Functioning: Congruent      Mood: anxious and depressed      Level of consciousness:  Alert      Response to Learning: Able to verbalize current knowledge/experience, Able to verbalize/acknowledge new learning, Able to retain information, Capable of insight, Able to change behavior, and Progressing to goal      Endings: None Reported    Modes of Intervention: Activity      Discipline Responsible: Behavorial Health Tech      Signature:  Elmira Cueva CNA

## 2024-04-02 PROCEDURE — 6370000000 HC RX 637 (ALT 250 FOR IP): Performed by: PHYSICIAN ASSISTANT

## 2024-04-02 PROCEDURE — 6370000000 HC RX 637 (ALT 250 FOR IP): Performed by: PSYCHIATRY & NEUROLOGY

## 2024-04-02 PROCEDURE — 1240000000 HC EMOTIONAL WELLNESS R&B

## 2024-04-02 RX ORDER — ESCITALOPRAM OXALATE 10 MG/1
10 TABLET ORAL DAILY
Qty: 30 TABLET | Refills: 0 | Status: SHIPPED | OUTPATIENT
Start: 2024-04-03 | End: 2024-05-03

## 2024-04-02 RX ORDER — HYDROXYZINE 50 MG/1
50 TABLET, FILM COATED ORAL 3 TIMES DAILY PRN
Qty: 30 TABLET | Refills: 0 | Status: SHIPPED | OUTPATIENT
Start: 2024-04-02 | End: 2024-04-12

## 2024-04-02 RX ADMIN — ESCITALOPRAM OXALATE 10 MG: 10 TABLET ORAL at 08:52

## 2024-04-02 RX ADMIN — TRAZODONE HYDROCHLORIDE 50 MG: 50 TABLET ORAL at 20:46

## 2024-04-02 NOTE — GROUP NOTE
Group Therapy Note    Date: 4/2/2024    Group Start Time: 1000  Group End Time: 1045  Group Topic: Activity    [facility]    Edilma Longoria LPN        Group Therapy Note    Attendees: 3/3       Patient's Goal:  to get home and feel better    Notes:  pt participated by speaking about she loves to be around other people, and she is feeling like this peer to peer group is good for her, to experience other things with other people, not always her way or what she wants..    Status After Intervention:  Unchanged    Participation Level: Active Listener and Interactive    Participation Quality: Appropriate, Attentive, and Sharing      Speech:  normal      Thought Process/Content: Logical      Affective Functioning: Congruent      Mood: anxious and euthymic      Level of consciousness:  Alert, Oriented x4, and Attentive      Response to Learning: Capable of insight and Progressing to goal      Endings: None Reported    Modes of Intervention: Education      Discipline Responsible: Licensed Practical Nurse      Signature:  Edilma Longoria LPN

## 2024-04-02 NOTE — GROUP NOTE
Group Therapy Note    Date: 4/2/2024    Group Start Time: 1600  Group End Time: 1645  Group Topic: Education Group - Inpatient    [facility]    Edilma Longoria LPN        Group Therapy Note    Attendees: 3/3       Patient's Goal:  to get home to cuddle with her dog    Notes:  pt. Participated in group by speaking about her anxiety about leaving, but feels she is ready to go home, continue on her medication, and use her coping skills.    Status After Intervention:  Unchanged    Participation Level: Active Listener and Interactive    Participation Quality: Appropriate, Attentive, Sharing, and Supportive      Speech:  normal      Thought Process/Content: Logical      Affective Functioning: Congruent      Mood: euthymic      Level of consciousness:  Alert, Oriented x4, and Attentive      Response to Learning: Capable of insight and Progressing to goal      Endings: None Reported    Modes of Intervention: Education      Discipline Responsible: Licensed Practical Nurse      Signature:  Eidlma Longoria LPN

## 2024-04-02 NOTE — GROUP NOTE
Group Therapy Note    Date: 4/2/2024    Group Start Time: 1100  Group End Time: 1145  Group Topic: Nursing    [facility]    Edilma Longoria LPN        Group Therapy Note    Attendees: 3/3       Patient's Goal:  to talk through his anxiety    Notes:  pt. States that if she can continue talking through her anxiety with people she trusts, she feels she will be able to handle her anxiety better.    Status After Intervention:  Unchanged    Participation Level: Active Listener and Interactive    Participation Quality: Appropriate, Attentive, Sharing, and Supportive      Speech:  normal      Thought Process/Content: Logical      Affective Functioning: Congruent      Mood: euthymic      Level of consciousness:  Alert, Oriented x4, and Attentive      Response to Learning: Capable of insight and Progressing to goal      Endings: None Reported    Modes of Intervention: Education      Discipline Responsible: Licensed Practical Nurse      Signature:  Edilma Longoria LPN

## 2024-04-02 NOTE — GROUP NOTE
Group Therapy Note    Date: 2024    Group Start Time: 09  Group End Time: 945  Group Topic: Community Meeting    [facility]    Edilma Longoria LPN        Group Therapy Note    Attendees: 3/3       Patient's Goal:  to get home and go to a  tomorrow, complete college.    Notes:  Pt.s smiled, participated, by speaking on her own anxiety, utilizing friends to call when her anxiety builds up and not try to take care of it by herself alone, along with taking her medication.    Status After Intervention:  Unchanged    Participation Level: Active Listener and Interactive    Participation Quality: Appropriate, Attentive, and Sharing      Speech:  normal      Thought Process/Content: Logical      Affective Functioning: Congruent      Mood: euthymic      Level of consciousness:  Alert, Oriented x4, and Attentive      Response to Learning: Capable of insight and Progressing to goal      Endings: None Reported    Modes of Intervention: Education      Discipline Responsible: Licensed Practical Nurse      Signature:  Edilma Longoria LPN

## 2024-04-02 NOTE — PLAN OF CARE
Problem: Discharge Planning  Goal: Discharge to home or other facility with appropriate resources  Outcome: Progressing     Problem: Self Harm/Suicidality  Goal: Will have no self-injury during hospital stay  Description: INTERVENTIONS:  1.  Ensure constant observer at bedside with Q15M safety checks  2.  Maintain a safe environment  3.  Secure patient belongings  4.  Ensure family/visitors adhere to safety recommendations  5.  Ensure safety tray has been added to patient's diet order  6.  Every shift and PRN: Re-assess suicidal risk via Frequent Screener    4/1/2024 2247 by Jose Mireles RN  Outcome: Progressing  4/1/2024 1053 by Ananda Lambert RN  Outcome: Progressing     Problem: Depression  Goal: Will be euthymic at discharge  Description: INTERVENTIONS:  1. Administer medication as ordered  2. Provide emotional support via 1:1 interaction with staff  3. Encourage involvement in milieu/groups/activities  4. Monitor for social isolation  4/1/2024 2247 by Jose Mireles RN  Outcome: Progressing  4/1/2024 1053 by Ananda Lambert RN  Outcome: Progressing     Problem: Anxiety  Goal: Will report anxiety at manageable levels  Description: INTERVENTIONS:  1. Administer medication as ordered  2. Teach and rehearse alternative coping skills  3. Provide emotional support with 1:1 interaction with staff  4/1/2024 2247 by Jose Mireles RN  Outcome: Progressing  4/1/2024 1053 by Ananda Lambert RN  Outcome: Progressing

## 2024-04-02 NOTE — GROUP NOTE
Group Therapy Note    Date: 4/1/2024    Group Start Time: 2000  Group End Time: 2045  Group Topic: Wrap-Up    [facility]    Linn Stinson        Group Therapy Note    Attendees: 2         Patient's Goal:  n/a    Notes:  happy    Status After Intervention:  Improved    Participation Level: Active Listener    Participation Quality: Supportive      Speech:  normal      Thought Process/Content: Logical      Affective Functioning: Congruent      Mood:  happy      Level of consciousness:  Alert      Response to Learning: Able to verbalize current knowledge/experience      Endings: None Reported    Modes of Intervention: Socialization      Discipline Responsible: Behavorial Health Tech      Signature:  Linn Stinson

## 2024-04-03 VITALS
OXYGEN SATURATION: 95 % | TEMPERATURE: 96.9 F | SYSTOLIC BLOOD PRESSURE: 135 MMHG | HEIGHT: 68 IN | WEIGHT: 293 LBS | RESPIRATION RATE: 18 BRPM | HEART RATE: 107 BPM | BODY MASS INDEX: 44.41 KG/M2 | DIASTOLIC BLOOD PRESSURE: 79 MMHG

## 2024-04-03 PROCEDURE — 6370000000 HC RX 637 (ALT 250 FOR IP): Performed by: PHYSICIAN ASSISTANT

## 2024-04-03 RX ADMIN — ESCITALOPRAM OXALATE 10 MG: 10 TABLET ORAL at 07:59

## 2024-04-03 NOTE — H&P
Behavioral Health Transition Record to Provider    Patient Name: Princess Tracey  YOB: 2000  Medical Record Number: [id number]  Date of Admission: 3/30/2024  Date of Discharge: 4/3/2024    Attending Provider: Phoenix Bashir MD  Discharging Provider: Dr. Bashir  To contact this individual call [phone] and ask the  to page.  If unavailable, ask to be transferred to Behavioral Health Provider on call.  A Behavioral Health Provider will be available on call 24/7 and during holidays.    Primary Care Provider: No primary care provider on file.    Allergies   Allergen Reactions    Latex Hives    Pineapple Anaphylaxis       Reason for Admission: Suicidal Ideation  Admission Diagnosis: PTSD, Generalized Anxiety Disorder, Major depressive disorder with single episode, cannabis abuse.  REASON FOR HOSPITALIZATION:  CC: \"I am not sure what I was thinking\". Pt admitted under a voluntary basis for anxiety with suicidal ideations proving to be an imminent danger to self.   HISTORY OF PRESENT ILLNESS:    The patient, Princess Tracey, is a 24 y.o.  Black /  female no past psychiatric history  came to the emergency department after concern of her own and  related to bringing a gun into the bathroom. She states that she normally takes a gun with to take a shower but her  felt she was \"looking or peering\" at the gun differently this time.  When she was asked how she was feeling she responded vaguely and uncertainly.  This response and in context of a prior disagreement opted to come in for help.  Although she describes few to no depressive symptoms but feeling more anxious and overwhelmed. Normally she describes herself as the one everyone leans on for support just that things were building up inside.     She does use marijuana to relax most days with a positive UDS for the same.     Endorses intermittent depressed mood, episodic tearfulness, anhedonia otherwise denies

## 2024-04-03 NOTE — PROGRESS NOTES
Psychiatric Progress Note      Patient: Princess Tracey MRN: [id number]  SSN: [id number]    YOB: 2000  Age: 24 y.o.  Sex: female      Admit Date: 3/30/2024       Subjective:     Princess Tracey stated that she is feeling good.  She denied any depressed or anxious mood.  Patient is future oriented.  Denied having any access to gun.  Attending groups and working on coping skills.  Reported good sleep and appetite.  Denied any suicidal or homicidal ideations.  Denied any side effects of medications.    Objective:     Vitals:    04/01/24 0615 04/01/24 1554 04/02/24 0550 04/02/24 1500   BP: (!) 135/90 109/68 (!) 142/92 119/70   Pulse: (!) 110 91 100 99   Resp: 18 18 16 18   Temp: 97.7 °F (36.5 °C) 97.7 °F (36.5 °C) 96.9 °F (36.1 °C) 97.7 °F (36.5 °C)   TempSrc: Temporal Temporal Temporal Temporal   SpO2: 97% 98% 98%    Weight:       Height:            Mental Status Exam:     Appearance-fairly groomed  Alert, oriented x4  Speech -normal rate, volume and rhythm  Mood-depressed/anxious-improving  Affect-congruent  Thought process-linear and goal directed  Thought content-no delusions   Thought perception-no auditory or visual hallucinations   No suicidal or homicidal ideations   Insight fair  Judgement fair     MEDICATIONS:  Current Facility-Administered Medications   Medication Dose Route Frequency    acetaminophen (TYLENOL) tablet 650 mg  650 mg Oral Q4H PRN    polyethylene glycol (GLYCOLAX) packet 17 g  17 g Oral Daily PRN    haloperidol (HALDOL) tablet 5 mg  5 mg Oral Q4H PRN    Or    haloperidol lactate (HALDOL) injection 5 mg  5 mg IntraMUSCular Q4H PRN    diphenhydrAMINE (BENADRYL) injection 50 mg  50 mg IntraMUSCular Q4H PRN    traZODone (DESYREL) tablet 50 mg  50 mg Oral Nightly PRN    aluminum & magnesium hydroxide-simethicone (MAALOX) 200-200-20 MG/5ML suspension 30 mL  30 mL Oral Q6H PRN    hydrOXYzine HCl (ATARAX) tablet 50 mg  50 mg Oral TID PRN    escitalopram (LEXAPRO) tablet 10 mg  10 mg

## 2024-04-03 NOTE — GROUP NOTE
Group Therapy Note    Date: 4/2/2024    Group Start Time: 2000  Group End Time: 2045  Group Topic: Wrap-Up    [facility]    Linn Stinson        Group Therapy Note    Attendees: 2       Patient's Goal:  n/a    Notes:  happy    Status After Intervention:  Improved    Participation Level: Active Listener    Participation Quality: Supportive      Speech:  normal      Thought Process/Content: Logical      Affective Functioning: Congruent      Mood:  happy      Level of consciousness:  Alert      Response to Learning: Able to verbalize current knowledge/experience      Endings: None Reported    Modes of Intervention: Socialization      Discipline Responsible: Behavorial Health Tech      Signature:  Linn Stinson

## 2024-04-03 NOTE — BH NOTE
B:   Patient alert and oriented x 4.   Pt. States depression is 0.  Pt. States Anxiety is 0.  Pt. denies Hallucinations.  Pt. denies Delusions.  Pt. denies SI.  Pt. denies HI.   Pt. cooperative with Assessment.  Pt.'s behavior Cooperative and Pleasant.   Pt reports she is no longer feeling suicidal. She misses her family and  since they have been her biggest supporters. Pt states she does not know what she is feeling that is making her cry so much. Pt states, \"I think it's because I have had to be strong for my family being the eldest daughter. I just came to terms that my father had passed away and now my grandfather passed recently. I grew up seeing my friends shot and killed. So my mother sheltered me from the world because she was afraid something would happen to me. I had the gun in the bathroom with me, but I take it everywhere with me. I did not know that it wasn't normal to feel that way and carry a gun everywhere with you. I have really bad depression and anxiety, so I do want help. This is just hard for me because I did not know what to expect. No one told me I wasn't going to have visitors and they were going to take my phone, that's what made me more anxious yesterday.\" Writer encouraged pt to work on herself and feel her emotions now that she has some time to herself. Writer also encouraged pt to develop and practice coping skills along with her medications. Pt in agreement and understanding.     I:    If patient is disoriented, reorient pt.  Build trust with patient, by therapeutic listening and Groups.  Encourage pt. To attend and Participate in Groups.  Provide Medications as ordered and needed.  Encourage pt. To be up for all meals and snacks, and consume all of each. Encourage pt. To interact with staff and peers in a positive manner.  Encourage pt. To keep good hygiene.  Q 15 minute safety checks.    R:   Pt. did attend and Participate in Group.  Pt. Is Compliant with Medications   Yes.   
B:   Patient alert and oriented x 4.   Pt. States depression is 0.  Pt. States Anxiety is 0.  Pt. denies Hallucinations.  Pt. denies Delusions.  Pt. denies SI.  Pt. denies HI.   Pt. cooperative with Assessment.  Pt.'s behavior Cooperative and Pleasant.  Pt reports she has felt better than she had when she got here. Pt states she will talk to someone the next time she is feeling overwhelmed or anxious. She understands the severity of her actions. She is learning to deal with her emotions in a healthy manner everyday by using coping skills and talking with staff when needed.     I:    If patient is disoriented, reorient pt.  Build trust with patient, by therapeutic listening and Groups.  Encourage pt. To attend and Participate in Groups.  Provide Medications as ordered and needed.  Encourage pt. To be up for all meals and snacks, and consume all of each. Encourage pt. To interact with staff and peers in a positive manner.  Encourage pt. To keep good hygiene.  Q 15 minute safety checks.    R:   Pt. did attend and Participate in Group.  Pt. Is Compliant with Medications   Yes.   Pt. is getting up for meals and snacks.  Pt. Consumes 75% of Meals.  Pt. is, interacting with Peers.   Pt.'s hygiene is Good.  Pt. does not, have any safety issues.    P:   Pt. Will enhance communication skills within the next two days.  Pt. Will continue to comply with Plan of Care toward Discharge.  Pt. Will continue to stay safe on the unit.   
B:   Patient alert and oriented x 4.   Pt. States depression is 0.  Pt. States Anxiety is 4.  Pt. denies Hallucinations.  Pt. denies Delusions.  Pt. denies SI.  Pt. denies HI.   Pt. cooperative with Assessment.  Pt.'s behavior Anxious, Cooperative, and Pleasant. Pt expressed feeling somewhat better, just the anxiety is a lot. Did state that her family calls her a lot to handle things and it becomes overwhelming. Reported that her mother called and explained that her uncle passed away and the service is Wednesday in [place], which he was ill. Pt became tearful and stated \"I'm sad about the situation of my uncle and that the doctor told her she could leave Wednesday but tears that I'm finally getting help and I don't have to deal with this depression and anxiety on my own, anymore\" Pt wrote down goals and wants outside therapy.  Pt given Trazodone 50mg and Atarax 50mg.         I:    If patient is disoriented, reorient pt.  Build trust with patient, by therapeutic listening and Groups.  Encourage pt. To attend and Participate in Groups.  Provide Medications as ordered and needed.  Encourage pt. To be up for all meals and snacks, and consume all of each. Encourage pt. To interact with staff and peers in a positive manner.  Encourage pt. To keep good hygiene.  Q 15 minute safety checks.     R:   Pt. did attend and Participate in Group.  Pt. Is Compliant with Medications   Yes.   Pt. is getting up for meals and snacks.  Pt. Consumes 50% of Meals.  Pt. is, interacting with Staff/Peers.   Pt.'s hygiene is Fair.  Pt. does not, have any safety issues.     P:   Pt. Will develop and continue to utilize positive Coping skills.  Pt. Will continue to comply with Plan of Care toward Discharge.  Pt. Will continue to stay safe on the unit.     0600: Pt slept throughout the night without any distress noted while conducting rounds.                 
B:   Patient alert and oriented x 4.   Pt. States depression is 0/10.  Pt. States Anxiety is 0/10.  Pt. denies Hallucinations.  Pt. denies Delusions.  Pt. denies SI.  Pt. denies HI.   Pt. cooperative with Assessment.  Pt.'s behavior Cooperative and Pleasant.   States she is ready for discharge tomorrow.    I:    If patient is disoriented, reorient pt.  Build trust with patient, by therapeutic listening and Groups.  Encourage pt. To attend and Participate in Groups.  Provide Medications as ordered and needed.  Encourage pt. To be up for all meals and snacks, and consume all of each. Encourage pt. To interact with staff and peers in a positive manner.  Encourage pt. To keep good hygiene.  Q 15 minute safety checks.    R:   Pt. did attend and Participate in Group.  Pt. Is Compliant with Medications   Yes.   Pt. is getting up for meals and snacks.  Pt. Consumes 100% of Meals.  Pt. is, interacting with Peers.   Pt.'s hygiene is Good.  Pt. does not, have any safety issues.    P:   Pt. Will develop and continue to utilize positive Coping skills.  Pt. Will continue to comply with Plan of Care toward Discharge.  Pt. Will continue to stay safe on the unit.   
B:   Patient alert and oriented x 4.   Pt. States depression is 2.  Pt. States Anxiety is 5.  Pt. denies Hallucinations.  Pt. denies Delusions.  Pt. denies SI.  Pt. denies HI.   Pt. cooperative with Assessment.  Pt.'s behavior Anxious. Cooperative, Tearful but Pleasant. Pt expressed feeling overwhelmed for awhile and stressed. Pt states \"I don't want to die but thought at that time in the bathroom that she had no other way out\" Spoke about having a lot of anxiety. Noted Reyes started Lexapro 10mg po QHS. Pt  called and expressed that wife needs counseling to deal with the death of her father and grandfather. Pt given Trazodone 50mg and Atarax 50mg.        I:    If patient is disoriented, reorient pt.  Build trust with patient, by therapeutic listening and Groups.  Encourage pt. To attend and Participate in Groups.  Provide Medications as ordered and needed.  Encourage pt. To be up for all meals and snacks, and consume all of each. Encourage pt. To interact with staff and peers in a positive manner.  Encourage pt. To keep good hygiene.  Q 15 minute safety checks.    R:   Pt. did attend and Participate in Group.  Pt. Is Compliant with Medications   Yes.   Pt. is getting up for meals and snacks.  Pt. Consumes 50% of Meals.  Pt. is, interacting with Staff/Peers.   Pt.'s hygiene is Fair.  Pt. does not, have any safety issues.    P:   Pt. Will develop and continue to utilize positive Coping skills.  Pt. Will continue to comply with Plan of Care toward Discharge.  Pt. Will continue to stay safe on the unit.    0600: Pt slept throughout the night without any distress noted while conducting rounds.    
Dr. Mckay nicole pt via skype, they discussed possible discharge tomorrow morning. No new orders.   
Pt received on the phone  then went to watch TV in dayroom.   Pt attended wrap up group   and  ate snack. Rated depression as 0 and anxiety as 3  in group paper.      , pt  alert and oriented x 4, denies SI/HI , denies A/V hallucinations. No complaints made,     No scheduled HS medication prescribed     Given at 2104 po prn Trazodone 50mg and Atarax 50mg for anxiety and insomnia, helpful        start sleeping by 2200      Remained sleeping  as  of this time .     No violent no self harming behaviors noticed or reported  
Pt. Belongings given back to pt., verified all there, signed for. Discharge instructions explained to pt. Including, Follow up appt. With [facility] M, W, or F 8:30-3:00 pm.      Medications called into [facility], TO [facility] @ [phone].  Pt. Denies SI/HI/AVH at time of discharge.     Discharge Paperwork and H&P given to patient to hand carry to follow-up.  Pt displayed no safety concerns at discharge.     Pt escorted to front by staff for transportation by spouse personal vehicle to home.    
Reviewed chart for possible admission per TIMOTEO Miller.    
TREATMENT TEAM COMPLETED    Attending meeting was as follows:  Patient, Anandaan Hidalgo, Charge RN, and Dr. Bashir.       Meeting was conducted via skype      Daily Treatment Team consists of the following:     Pt. Cognitive status:   Awake, Alert and Oriented x4    Pt. Attending Groups: Yes    Pt. Participating in groups:  Yes    Pt. Homicidal / Suicidal: normal    Pt. Behaviors:  Cooperative and Pleasant    Pt. Compliant with Medications:  Yes          New Medication orders:  No      Discharge Plan:  Home    Provider discussed discharge planning will start tomorrow. Pt reports her  has removed the gun from her house. Staff will start working with patient to start a safety plan. Pt in agreement and understanding.    
Writer spoke with pt . No concerns from  to come home.  states, \"My wife has spoken really highly of all the staff that have been taking care of her. She sounds more clear and less stressed. I can tell from our conversations that she has used the resources you all have given her during group.\" Pt  reports he has removed the gun  from the house. No safety concerns.   
Voluntary       Reason for admission: Anxiety, Depression with suicidal ideation with plan and intent         Addictive Behavior: n/a         Medical Problems:   History reviewed. No pertinent past medical history.      Psych History:  Pt has never been diagnosed with a psychiatric illness, has never seen a practitioner for a mental health problem.  Pt has briefly seen a counselor in college for anxiety.        Patient is not a smoker.   If so, Nicotine patch ordered? N/a     Patient does not drink Alcohol.      Patient does not use Recreational substances or Street Drugs except marijuana.      Status EXAM:  Mental Status and Behavioral Exam  Normal: No  Level of Assistance: Independent/Self  Facial Expression: Sad, Worried  Affect: Congruent  Level of Consciousness: Alert  Frequency of Checks: 4 times per hour, close  Mood:Normal: No  Mood: Depressed, Anxious  Motor Activity:Normal: Yes  Eye Contact: Good  Observed Behavior: Cooperative, Tearful  Sexual Misconduct History: Current - no  Preception: [place] to person, [place] to time, [place] to place, [place] to situation  Attention:Normal: Yes  Thought Processes: Blocking  Thought Content:Normal: Yes  Depression Symptoms: Crying, Isolative, Loss of interest  Anxiety Symptoms: Generalized  Jazmine Symptoms: No problems reported or observed.  Hallucinations: None  Delusions: No  Memory:Normal: Yes  Insight and Judgment: No  Insight and Judgment: Poor judgment, Poor insight    Pt admitted with followings belongings:  Dental Appliances: None  Vision - Corrective Lenses: None  Hearing Aid: None  Jewelry: Bracelet, Earrings, Ring (wedding bands with pt)  Body Piercings Removed: Yes  Clothing: Jacket/Coat, Footwear, Pants, Undergarments  Other Valuables: Other (Comment) (n/a)     Valuables placed in safe in security envelope.  Patient belongings, locked in locker on unit. Valuables left with Patient at bedside pt's wedding bands on the appropriate finger.  Patient did not have 
Please continue all medications until otherwise directed by physician.         Patient Transition Record Discussed with Patient and copy given to patient, with pt return verbalization of understanding?   Yes            Patient discharged to Home      If pt. Was NOT discharged home and was discharged to another facility, were the following discussed with other facility?     Our 24-hour/7 day content information, including physician for emergencies related to inpatient stay? yes    2.    The Contact information for Pending studies, which is the [phone]?  yes    3.    Pt's plan for follow up, as noted above in follow up?  yes    4.    Pt's primary Physician, other healthcare professional, or site designated for follow-up care? yes

## 2024-04-04 LAB — TSH SERPL DL<=0.05 MIU/L-ACNC: 1.17 UIU/ML (ref 0.45–4.5)

## 2024-09-04 ENCOUNTER — HOSPITAL ENCOUNTER (EMERGENCY)
Facility: HOSPITAL | Age: 24
Discharge: HOME | End: 2024-09-04
Attending: EMERGENCY MEDICINE

## 2024-09-04 VITALS
SYSTOLIC BLOOD PRESSURE: 112 MMHG | HEART RATE: 74 BPM | HEIGHT: 68 IN | RESPIRATION RATE: 18 BRPM | TEMPERATURE: 97.8 F | OXYGEN SATURATION: 100 % | BODY MASS INDEX: 44.41 KG/M2 | DIASTOLIC BLOOD PRESSURE: 78 MMHG | WEIGHT: 293 LBS

## 2024-09-04 DIAGNOSIS — B34.9 VIRAL SYNDROME: Primary | ICD-10-CM

## 2024-09-04 PROCEDURE — 99283 EMERGENCY DEPT VISIT LOW MDM: CPT

## 2024-09-04 PROCEDURE — 87636 SARSCOV2 & INF A&B AMP PRB: CPT

## 2024-09-04 RX ORDER — ACETAMINOPHEN 500 MG
1000 TABLET ORAL
Status: DISCONTINUED | OUTPATIENT
Start: 2024-09-04 | End: 2024-09-04 | Stop reason: HOSPADM

## 2024-09-04 RX ORDER — IBUPROFEN 600 MG/1
600 TABLET, FILM COATED ORAL
Status: DISCONTINUED | OUTPATIENT
Start: 2024-09-04 | End: 2024-09-04 | Stop reason: HOSPADM

## 2024-09-04 NOTE — ED TRIAGE NOTES
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

## 2024-09-04 NOTE — ED PROVIDER NOTES
medications      escitalopram 10 MG tablet  Commonly known as: LEXAPRO  Take 1 tablet by mouth daily                DISCONTINUED MEDICATIONS:  Current Discharge Medication List          I am the Primary Clinician of Record. Carrie Thomas MD (electronically signed)    (Please note that parts of this dictation were completed with voice recognition software. Quite often unanticipated grammatical, syntax, homophones, and other interpretive errors are inadvertently transcribed by the computer software. Please disregards these errors. Please excuse any errors that have escaped final proofreading.)     Carrie Thomas MD  09/04/24 [id number]

## 2024-09-04 NOTE — DISCHARGE INSTRUCTIONS
to:  (1) confirm your diagnosis,  (2) re-evaluation of changes in your illness and treatment, and (3) for ongoing care. Please take your discharge instructions with you when you go to your follow-up appointment.     If you have any problem arranging a follow-up appointment, contact us!  If your symptoms become worse or you do not improve as expected, please return to us. We are available 24 hours a day.     If a prescription has been provided, please fill it as soon as possible to prevent a delay in treatment. If you have any questions or reservations about taking the medication due to side effects or interactions with other medications, please call your primary care provider or contact us directly.  Again, THANK YOU for choosing us to care for YOU!

## 2025-01-15 ENCOUNTER — HOSPITAL ENCOUNTER (EMERGENCY)
Facility: HOSPITAL | Age: 25
Discharge: HOME | End: 2025-01-15
Attending: EMERGENCY MEDICINE

## 2025-01-15 VITALS
BODY MASS INDEX: 44.41 KG/M2 | RESPIRATION RATE: 20 BRPM | HEART RATE: 99 BPM | OXYGEN SATURATION: 99 % | SYSTOLIC BLOOD PRESSURE: 145 MMHG | TEMPERATURE: 98.1 F | DIASTOLIC BLOOD PRESSURE: 94 MMHG | WEIGHT: 293 LBS | HEIGHT: 68 IN

## 2025-01-15 DIAGNOSIS — R68.89 FLU-LIKE SYMPTOMS: Primary | ICD-10-CM

## 2025-01-15 PROCEDURE — 6370000000 HC RX 637 (ALT 250 FOR IP): Performed by: EMERGENCY MEDICINE

## 2025-01-15 PROCEDURE — 99283 EMERGENCY DEPT VISIT LOW MDM: CPT

## 2025-01-15 PROCEDURE — 87636 SARSCOV2 & INF A&B AMP PRB: CPT

## 2025-01-15 RX ORDER — IBUPROFEN 600 MG/1
600 TABLET, FILM COATED ORAL
Status: COMPLETED | OUTPATIENT
Start: 2025-01-15 | End: 2025-01-15

## 2025-01-15 RX ORDER — CODEINE PHOSPHATE AND GUAIFENESIN 10; 100 MG/5ML; MG/5ML
10 SOLUTION ORAL ONCE
Status: COMPLETED | OUTPATIENT
Start: 2025-01-15 | End: 2025-01-15

## 2025-01-15 RX ORDER — ACETAMINOPHEN 500 MG
1000 TABLET ORAL 4 TIMES DAILY PRN
Qty: 30 TABLET | Refills: 0 | Status: SHIPPED | OUTPATIENT
Start: 2025-01-15

## 2025-01-15 RX ORDER — ACETAMINOPHEN 500 MG
1000 TABLET ORAL
Status: COMPLETED | OUTPATIENT
Start: 2025-01-15 | End: 2025-01-15

## 2025-01-15 RX ORDER — ONDANSETRON 4 MG/1
4 TABLET, ORALLY DISINTEGRATING ORAL 3 TIMES DAILY PRN
Qty: 21 TABLET | Refills: 0 | Status: SHIPPED | OUTPATIENT
Start: 2025-01-15

## 2025-01-15 RX ORDER — IBUPROFEN 400 MG/1
400 TABLET, FILM COATED ORAL EVERY 6 HOURS PRN
Qty: 120 TABLET | Refills: 0 | Status: SHIPPED | OUTPATIENT
Start: 2025-01-15

## 2025-01-15 RX ORDER — GUAIFENESIN/DEXTROMETHORPHAN 100-10MG/5
5 SYRUP ORAL 3 TIMES DAILY PRN
Qty: 120 ML | Refills: 0 | Status: SHIPPED | OUTPATIENT
Start: 2025-01-15 | End: 2025-01-25

## 2025-01-15 RX ORDER — OSELTAMIVIR PHOSPHATE 75 MG/1
75 CAPSULE ORAL 2 TIMES DAILY
Qty: 10 CAPSULE | Refills: 0 | Status: SHIPPED | OUTPATIENT
Start: 2025-01-15 | End: 2025-01-20

## 2025-01-15 RX ORDER — BENZONATATE 100 MG/1
100-200 CAPSULE ORAL 3 TIMES DAILY PRN
Qty: 60 CAPSULE | Refills: 0 | Status: SHIPPED | OUTPATIENT
Start: 2025-01-15 | End: 2025-01-25

## 2025-01-15 RX ORDER — DIPHENHYDRAMINE HCL 25 MG
25 CAPSULE ORAL EVERY 6 HOURS PRN
Qty: 20 CAPSULE | Refills: 0 | Status: SHIPPED | OUTPATIENT
Start: 2025-01-15 | End: 2025-01-25

## 2025-01-15 RX ORDER — FEXOFENADINE HCL AND PSEUDOEPHEDRINE HCI 60; 120 MG/1; MG/1
1 TABLET, EXTENDED RELEASE ORAL 2 TIMES DAILY
Qty: 20 TABLET | Refills: 0 | Status: SHIPPED | OUTPATIENT
Start: 2025-01-15

## 2025-01-15 RX ADMIN — IBUPROFEN 600 MG: 600 TABLET, FILM COATED ORAL at 10:08

## 2025-01-15 RX ADMIN — ACETAMINOPHEN 1000 MG: 500 TABLET ORAL at 10:08

## 2025-01-15 RX ADMIN — GUAIFENESIN AND CODEINE PHOSPHATE 10 ML: 100; 10 SOLUTION ORAL at 10:08

## 2025-01-15 NOTE — ED PROVIDER NOTES
[facility] EMERGENCY DEPT  EMERGENCY DEPARTMENT HISTORY AND PHYSICAL EXAM      Date: 1/15/2025  Patient Name: Princess Tracey  MRN: [id number]  Birthdate 2000  Date of evaluation: 1/15/2025  Provider: Chris Ellis MD   Note Started: 9:58 AM EST 1/15/25    HISTORY OF PRESENT ILLNESS   Chief Complaint   Patient presents with   • Cough       History Provided By: Patient    HPI: Princess Tracey is a 25 y.o. female Patient Name: Alexy     Subjective: Miss Tracey presents with a chief complaint of a bad cough and pain in the pelvic area. She reports that the cough started about five days ago, initially thought to be a common cold, but has persisted without improvement. She also describes experiencing body aches and tightness in her belly, with sharp pain beginning about two days ago, coinciding with her coughing. She has taken Mucinex and NyQuil for her symptoms. Her  has similar symptoms but with more significant belly pain. No current medications were taken today prior to the visit.     Objective: Patient's blood pressure is reported as fine. No other physical exam findings or vital signs were detailed in the transcript. No diagnostic tests have been performed yet.     Assessment: Provisional diagnosis of influenza (flu) based on symptoms. Differential diagnosis includes muscle tension related to coughing.     Plan: The clinician suggests a full workup including labs and potentially a CT scan of the belly, but the patient opts to hold off for now. Medication for aching will be provided while waiting.      PAST MEDICAL HISTORY   Past Medical History:  History reviewed. No pertinent past medical history.    Past Surgical History:  History reviewed. No pertinent surgical history.    Family History:  History reviewed. No pertinent family history.    Social History:  Social History     Tobacco Use   • Smoking status: Never   • Smokeless tobacco: Never   Substance Use Topics   • Alcohol use: Yes   • Drug use: Never

## 2025-01-15 NOTE — DISCHARGE INSTRUCTIONS
Thank you for choosing our Emergency Department for your care.  It is our privilege to care for you in your time of need.  In the next several days, you may receive a survey via email or mailed to your home about your experience with our team.  We would greatly appreciate you taking a few minutes to complete the survey, as we use this information to learn what we have done well and what we could be doing better. Thank you for trusting us with your care!    Below you will find a list of your tests from today's visit.   Labs and Radiology Studies  Recent Results (from the past 12 hour(s))   COVID-19 & Influenza Combo    Collection Time: 01/15/25  9:33 AM    Specimen: Nasopharyngeal   Result Value Ref Range    SARS-CoV-2, PCR Not Detected Not Detected      Rapid Influenza A By PCR Not Detected Not Detected      Rapid Influenza B By PCR Not Detected Not Detected       No results found.  ------------------------------------------------------------------------------------------------------------  The evaluation and treatment you received in the Emergency Department were for an urgent problem. It is important that you follow-up with a doctor, nurse practitioner, or physician assistant to:  (1) confirm your diagnosis,  (2) re-evaluation of changes in your illness and treatment, and (3) for ongoing care. Please take your discharge instructions with you when you go to your follow-up appointment.     If you have any problem arranging a follow-up appointment, contact us!  If your symptoms become worse or you do not improve as expected, please return to us. We are available 24 hours a day.     If a prescription has been provided, please fill it as soon as possible to prevent a delay in treatment. If you have any questions or reservations about taking the medication due to side effects or interactions with other medications, please call your primary care provider or contact us directly.  Again, THANK YOU for choosing us to  care for YOU!

## 2025-05-12 ENCOUNTER — HOSPITAL ENCOUNTER (EMERGENCY)
Facility: HOSPITAL | Age: 25
Discharge: HOME | End: 2025-05-12
Attending: EMERGENCY MEDICINE
Payer: COMMERCIAL

## 2025-05-12 VITALS
DIASTOLIC BLOOD PRESSURE: 76 MMHG | RESPIRATION RATE: 18 BRPM | HEART RATE: 82 BPM | BODY MASS INDEX: 44.41 KG/M2 | TEMPERATURE: 98.2 F | OXYGEN SATURATION: 100 % | HEIGHT: 68 IN | WEIGHT: 293 LBS | SYSTOLIC BLOOD PRESSURE: 121 MMHG

## 2025-05-12 DIAGNOSIS — G43.909 MIGRAINE WITHOUT STATUS MIGRAINOSUS, NOT INTRACTABLE, UNSPECIFIED MIGRAINE TYPE: Primary | ICD-10-CM

## 2025-05-12 DIAGNOSIS — R07.89 CHEST WALL PAIN: ICD-10-CM

## 2025-05-12 LAB — HCG UR QL: NEGATIVE

## 2025-05-12 PROCEDURE — 81025 URINE PREGNANCY TEST: CPT

## 2025-05-12 PROCEDURE — 93005 ELECTROCARDIOGRAM TRACING: CPT | Performed by: EMERGENCY MEDICINE

## 2025-05-12 PROCEDURE — 6370000000 HC RX 637 (ALT 250 FOR IP): Performed by: EMERGENCY MEDICINE

## 2025-05-12 PROCEDURE — 99284 EMERGENCY DEPT VISIT MOD MDM: CPT

## 2025-05-12 RX ORDER — METOCLOPRAMIDE 5 MG/1
10 TABLET ORAL
Status: COMPLETED | OUTPATIENT
Start: 2025-05-12 | End: 2025-05-12

## 2025-05-12 RX ORDER — DIPHENHYDRAMINE HCL 25 MG
25 TABLET ORAL EVERY 6 HOURS PRN
Qty: 20 TABLET | Refills: 1 | Status: SHIPPED | OUTPATIENT
Start: 2025-05-12 | End: 2025-06-11

## 2025-05-12 RX ORDER — METOCLOPRAMIDE 10 MG/1
10 TABLET ORAL
Qty: 20 TABLET | Refills: 0 | Status: SHIPPED | OUTPATIENT
Start: 2025-05-12

## 2025-05-12 RX ORDER — IBUPROFEN 600 MG/1
600 TABLET, FILM COATED ORAL EVERY 8 HOURS PRN
Qty: 20 TABLET | Refills: 1 | Status: SHIPPED | OUTPATIENT
Start: 2025-05-12

## 2025-05-12 RX ORDER — DIPHENHYDRAMINE HCL 25 MG
25 TABLET ORAL
Status: COMPLETED | OUTPATIENT
Start: 2025-05-12 | End: 2025-05-12

## 2025-05-12 RX ORDER — IBUPROFEN 600 MG/1
600 TABLET, FILM COATED ORAL
Status: COMPLETED | OUTPATIENT
Start: 2025-05-12 | End: 2025-05-12

## 2025-05-12 RX ADMIN — METOCLOPRAMIDE 10 MG: 5 TABLET ORAL at 08:24

## 2025-05-12 RX ADMIN — IBUPROFEN 600 MG: 600 TABLET, FILM COATED ORAL at 08:23

## 2025-05-12 RX ADMIN — DIPHENHYDRAMINE HYDROCHLORIDE 25 MG: 25 TABLET ORAL at 08:23

## 2025-05-12 ASSESSMENT — ENCOUNTER SYMPTOMS
RESPIRATORY NEGATIVE: 1
CHEST TIGHTNESS: 0
ALLERGIC/IMMUNOLOGIC NEGATIVE: 1
COUGH: 0
SHORTNESS OF BREATH: 0
EYES NEGATIVE: 1

## 2025-05-12 NOTE — ED PROVIDER NOTES
[facility] EMERGENCY DEPARTMENT  EMERGENCY DEPARTMENT ENCOUNTER      Pt Name: Princess Tracey  MRN: [id number]  Birthdate 2000  Date of evaluation: 5/12/2025  Provider: Orlin Mitchell MD    CHIEF COMPLAINT       Chief Complaint   Patient presents with    Chest Pain    Headache         HISTORY OF PRESENT ILLNESS   (Location/Symptom, Timing/Onset, Context/Setting, Quality, Duration, Modifying Factors, Severity)  Note limiting factors.   Princess Tracey is a 25 y.o. female who presents to the emergency department primarily complaining of left-sided headache.  Long history of migraine headaches more frequently recently.  Also has ongoing chest pain worse with movement palpation.  Headache with mild nausea mild photosensitivity similar to prior headaches of not quite as severe.  Denies shortness of breath.  No lower extremity pain or swelling no prior history of thromboembolic disease no history of heart disease hypertension diabetes or elevated lipids no early family history of coronary disease; morbid obesity noted    HPI    Nursing Notes were reviewed.    REVIEW OF SYSTEMS    (2-9 systems for level 4, 10 or more for level 5)     Review of Systems   Constitutional: Negative.    HENT: Negative.     Eyes: Negative.    Respiratory: Negative.  Negative for cough, chest tightness and shortness of breath.    Cardiovascular:  Positive for chest pain. Negative for palpitations and leg swelling.   Endocrine: Negative.    Genitourinary: Negative.    Musculoskeletal: Negative.    Allergic/Immunologic: Negative.    Neurological: Negative.    Hematological: Negative.    Psychiatric/Behavioral: Negative.         Except as noted above the remainder of the review of systems was reviewed and negative.       PAST MEDICAL HISTORY   History reviewed. No pertinent past medical history.      SURGICAL HISTORY     History reviewed. No pertinent surgical history.      CURRENT MEDICATIONS       Previous Medications    ACETAMINOPHEN

## 2025-05-12 NOTE — ED TRIAGE NOTES
Patient reports chest pain and headache that started last night took ibuprofen for same with little relief  Chest pain is mid sternal and increases with intensity pain 5/10 at this time

## 2025-05-14 LAB
EKG ATRIAL RATE: 85 BPM
EKG DIAGNOSIS: NORMAL
EKG P AXIS: -1 DEGREES
EKG P-R INTERVAL: 139 MS
EKG Q-T INTERVAL: 367 MS
EKG QRS DURATION: 70 MS
EKG QTC CALCULATION (BAZETT): 434 MS
EKG R AXIS: 54 DEGREES
EKG T AXIS: -13 DEGREES
EKG VENTRICULAR RATE: 84 BPM